# Patient Record
Sex: FEMALE | Race: WHITE | NOT HISPANIC OR LATINO | Employment: OTHER | URBAN - METROPOLITAN AREA
[De-identification: names, ages, dates, MRNs, and addresses within clinical notes are randomized per-mention and may not be internally consistent; named-entity substitution may affect disease eponyms.]

---

## 2021-04-19 ENCOUNTER — OFFICE VISIT (OUTPATIENT)
Dept: FAMILY MEDICINE CLINIC | Facility: CLINIC | Age: 56
End: 2021-04-19

## 2021-04-19 VITALS
HEIGHT: 64 IN | SYSTOLIC BLOOD PRESSURE: 118 MMHG | TEMPERATURE: 97.3 F | BODY MASS INDEX: 28.51 KG/M2 | RESPIRATION RATE: 16 BRPM | WEIGHT: 167 LBS | OXYGEN SATURATION: 100 % | HEART RATE: 86 BPM | DIASTOLIC BLOOD PRESSURE: 79 MMHG

## 2021-04-19 DIAGNOSIS — G89.29 CHRONIC BILATERAL LOW BACK PAIN WITH SCIATICA, SCIATICA LATERALITY UNSPECIFIED: ICD-10-CM

## 2021-04-19 DIAGNOSIS — H26.9 CATARACT OF RIGHT EYE, UNSPECIFIED CATARACT TYPE: Primary | ICD-10-CM

## 2021-04-19 DIAGNOSIS — J30.9 ALLERGIC RHINITIS, UNSPECIFIED SEASONALITY, UNSPECIFIED TRIGGER: ICD-10-CM

## 2021-04-19 DIAGNOSIS — M54.40 CHRONIC BILATERAL LOW BACK PAIN WITH SCIATICA, SCIATICA LATERALITY UNSPECIFIED: ICD-10-CM

## 2021-04-19 PROBLEM — K64.8 INTERNAL HEMORRHOIDS: Status: ACTIVE | Noted: 2021-03-24

## 2021-04-19 PROBLEM — F41.9 ANXIETY DISORDER: Status: ACTIVE | Noted: 2020-09-09

## 2021-04-19 PROBLEM — R19.8 ALTERED BOWEL FUNCTION: Status: ACTIVE | Noted: 2021-02-04

## 2021-04-19 PROBLEM — E06.3 AUTOIMMUNE HYPOTHYROIDISM: Status: ACTIVE | Noted: 2020-09-09

## 2021-04-19 PROBLEM — D12.6 TUBULAR ADENOMA OF COLON: Status: ACTIVE | Noted: 2021-03-24

## 2021-04-19 PROBLEM — R73.01 IMPAIRED FASTING GLUCOSE: Status: ACTIVE | Noted: 2020-09-09

## 2021-04-19 PROBLEM — Z12.11 COLON CANCER SCREENING: Status: ACTIVE | Noted: 2019-09-25

## 2021-04-19 PROBLEM — H05.89 ORBITAL MASS: Status: ACTIVE | Noted: 2018-01-02

## 2021-04-19 PROBLEM — J18.9 PNEUMONIA: Status: ACTIVE | Noted: 2018-04-01

## 2021-04-19 PROBLEM — F32.9 MAJOR DEPRESSIVE DISORDER: Status: ACTIVE | Noted: 2020-09-09

## 2021-04-19 PROBLEM — E03.9 HYPOTHYROIDISM: Status: ACTIVE | Noted: 2021-02-04

## 2021-04-19 PROBLEM — G56.20 CUBITAL TUNNEL SYNDROME: Status: ACTIVE | Noted: 2019-09-19

## 2021-04-19 PROBLEM — R93.5 ABNORMAL COMPUTERIZED AXIAL TOMOGRAPHY OF ABDOMEN: Status: ACTIVE | Noted: 2021-03-24

## 2021-04-19 PROBLEM — F43.12 CHRONIC POST-TRAUMATIC STRESS DISORDER: Status: ACTIVE | Noted: 2020-09-09

## 2021-04-19 PROBLEM — R63.4 ABNORMAL WEIGHT LOSS: Status: ACTIVE | Noted: 2021-02-04

## 2021-04-19 PROBLEM — E55.9 VITAMIN D DEFICIENCY: Status: ACTIVE | Noted: 2020-09-09

## 2021-04-19 PROBLEM — B02.9 SHINGLES: Status: ACTIVE | Noted: 2018-03-29

## 2021-04-19 PROBLEM — E78.2 MIXED HYPERLIPIDEMIA: Status: ACTIVE | Noted: 2020-09-09

## 2021-04-19 PROCEDURE — 99203 OFFICE O/P NEW LOW 30 MIN: CPT | Performed by: FAMILY MEDICINE

## 2021-04-19 RX ORDER — BUTALBITAL, ACETAMINOPHEN AND CAFFEINE 50; 325; 40 MG/1; MG/1; MG/1
TABLET ORAL
COMMUNITY

## 2021-04-19 RX ORDER — DULOXETIN HYDROCHLORIDE 60 MG/1
60 CAPSULE, DELAYED RELEASE ORAL DAILY
COMMUNITY

## 2021-04-19 RX ORDER — MELOXICAM 15 MG/1
TABLET ORAL
COMMUNITY
End: 2021-07-22

## 2021-04-19 RX ORDER — HYDROXYZINE HYDROCHLORIDE 25 MG/1
TABLET, FILM COATED ORAL
COMMUNITY

## 2021-04-19 RX ORDER — FLUTICASONE PROPIONATE 50 MCG
SPRAY, SUSPENSION (ML) NASAL
COMMUNITY
End: 2021-04-19 | Stop reason: SDUPTHER

## 2021-04-19 RX ORDER — LEVOTHYROXINE SODIUM 0.15 MG/1
TABLET ORAL
COMMUNITY

## 2021-04-19 RX ORDER — TIZANIDINE 2 MG/1
2 TABLET ORAL EVERY 8 HOURS PRN
Qty: 45 TABLET | Refills: 0 | Status: SHIPPED | OUTPATIENT
Start: 2021-04-19

## 2021-04-19 RX ORDER — FLUTICASONE PROPIONATE 50 MCG
1 SPRAY, SUSPENSION (ML) NASAL DAILY
Qty: 15.8 ML | Refills: 11 | Status: SHIPPED | OUTPATIENT
Start: 2021-04-19

## 2021-04-19 RX ORDER — CYCLOBENZAPRINE HCL 10 MG
TABLET ORAL
COMMUNITY
End: 2021-04-19

## 2021-04-19 RX ORDER — LORAZEPAM 2 MG/1
TABLET ORAL
COMMUNITY
End: 2021-07-22 | Stop reason: SDDI

## 2021-04-19 RX ORDER — ESTRADIOL 0.04 MG/D
FILM, EXTENDED RELEASE TRANSDERMAL
COMMUNITY

## 2021-04-19 RX ORDER — BUPROPION HYDROCHLORIDE 150 MG/1
TABLET ORAL
COMMUNITY
End: 2021-07-22 | Stop reason: SDDI

## 2021-04-19 NOTE — PROGRESS NOTES
Glenna Swain is a 56 y.o. female.     History of Present Illness     56-year-old female who is a new patient to me presents today to discuss hyperlipidemia, chronic back pain with sciatica, vitamin D deficiency, hypothyroidism and anxiety.    Recently moved to [place] from [facility].  Lipid panel CBC from September 2020 and July 2020 respectively normal.    Endoscopy March 2021; Normal esophagus, nonerosive gastritis normal examined duodenum.    Colonoscopy March 2021; several polyps and internal hemorrhoids; plan to repeat colonoscopy in 3 to 5 years.    Chronic low back pain with some sciatica symptoms; has done well with anti-inflammatories and cyclobenzaprine 10 mg in the past.  Of note when it comes to multiple joint pain issues she has been referred to rheumatology for further evaluation.    Anxiety disorder/depression/chronic PTSD: Follows with psychiatry; records from September 2020 showed that she is on treatment with lorazepam, hydroxyzine.  Per records, there is history of suicide attempt; [facility] Latuda did not work per patient; currently on Wellbutrin, Cymbalta, Ativan and Hydroxyzine PRN. [facility] [place] as well.    Hypothyroidism: Levothyroxine.    History of migraines: Fioricet in the past has been prescribed.  Has tried other medications which have not worked.  5-6 headaches a year on average.    Per records he is due for cataract surgery on right eye with ophthalmology in [place] needs new referral.    No flowsheet data found.    The following portions of the patient's history were reviewed and updated as appropriate: allergies, current medications, past family history, past medical history, past social history, past surgical history and problem list.    Review of Systems   Constitutional: Negative for chills and fever.   HENT: Negative for congestion.    Respiratory: Negative for cough and shortness of breath.     Cardiovascular: Negative for chest pain, palpitations and leg swelling.   Gastrointestinal: Negative for abdominal pain, diarrhea and vomiting.       Objective   Physical Exam  Constitutional:       Appearance: She is well-developed.   HENT:      Head: Normocephalic and atraumatic.      Mouth/Throat:      Pharynx: Uvula midline.   Eyes:      Pupils: Pupils are equal, round, and reactive to light.   Cardiovascular:      Rate and Rhythm: Normal rate and regular rhythm.      Heart sounds: No murmur heard.     Pulmonary:      Effort: Pulmonary effort is normal. No respiratory distress.      Breath sounds: Normal breath sounds. No stridor. No wheezing or rales.   Skin:     General: Skin is warm.   Neurological:      Mental Status: She is alert.   Psychiatric:         Behavior: Behavior normal.           No results found for: COLORU, CLARITYU, SPECGRAV, PHUR, LEUKOCYTESUR, NITRITE, PROTEINPOCUA, GLUCOSEUR, KETONESU, UROBILINOGEN, BILIRUBINUR, RBCUR      Assessment/Plan     Diagnoses and all orders for this visit:    1. Cataract of right eye, unspecified cataract type (Primary)  -     Ambulatory Referral to Ophthalmology    2. Chronic bilateral low back pain with sciatica, sciatica laterality unspecified  -     tiZANidine (ZANAFLEX) 2 MG tablet; Take 1 tablet by mouth Every 8 (Eight) Hours As Needed for Muscle Spasms.  Dispense: 45 tablet; Refill: 0  -     Ambulatory Referral to Physical Therapy Evaluate and treat    3. Allergic rhinitis, unspecified seasonality, unspecified trigger  -     fluticasone (Flonase) 50 MCG/ACT nasal spray; 1 spray into the nostril(s) as directed by provider Daily.  Dispense: 15.8 mL; Refill: 11    Return to clinic in 6 weeks to follow-up on low back pain after physical therapy.

## 2021-04-22 ENCOUNTER — TELEPHONE (OUTPATIENT)
Dept: FAMILY MEDICINE CLINIC | Facility: CLINIC | Age: 56
End: 2021-04-22

## 2021-04-22 NOTE — TELEPHONE ENCOUNTER
PATIENT IS REQUESTING A PROVIDER CHANGE TO [facility].  SHE STATES THAT HER INSURANCE STATES THAT SHE CANT SEE DR REEVES BUT CAN SEE [facility].  ACCORDING TO HER INSURANCE [facility] HAS SUBMITTED CREDENTIALS THAT MAKE HER AN ELIGIBLE PCP THAT DR REEVES HAS NOT    PATIENT WOULD LIKE TO BE SCHEDULED    PLEASE ADVISE    PATIENT CAN BE REACHED AT  [phone]

## 2021-04-23 ENCOUNTER — TELEPHONE (OUTPATIENT)
Dept: INTERNAL MEDICINE | Facility: CLINIC | Age: 56
End: 2021-04-23

## 2021-04-23 NOTE — TELEPHONE ENCOUNTER
Caller: Effie Swain    Relationship to patient: Self    Best call back number: [phone] (H)    Chief complaint: EYE SIGHT GETTING WORSE    Type of visit: NEW PATIENT WITH [facility] PA-C    Requested date: SOONER THAN 05/24/21    If rescheduling, when is the original appointment: 05/24/21    Additional notes: PATIENT STATES THAT SHE CAN NOT WAIT UNTIL 05/24/21 TO BE SEEN BY A DOCTOR.  PATIENT STATES THAT HER EYE SIGHT IS GETTING WORSE, AND DR. REEVES HAD A REFERRAL SET UP FOR HER TO SEE AN EYE DR. BUT DUE TO HER INSURANCE SHE CAN NO LONGER SEE DR. REEVES. PATIENT IS WANTING TO KNOW IF DR. [facility] CAN GO AHEAD AND SEND A REFERRAL TO DR. LOPEZ [facility] [place], HIS PHONE # IS: [phone].    PLEASE CONTACT PATIENT TO ADVISE.        THANKS

## 2021-04-23 NOTE — TELEPHONE ENCOUNTER
Caller: THOMAS GRACE    Relationship to patient: Emergency Contact    Best call back number: [phone]     Patient is needing: PATIENT'S SISTER WANTS TO KNOW IF DR CHONG IS TAKING NEW PATIENTS AND IF THE OFFICE TAKES [facility]-CARE.  PLEASE CALL BACK AND ADVISE.  THANK YOU.

## 2021-04-25 ENCOUNTER — TELEPHONE (OUTPATIENT)
Dept: FAMILY MEDICINE CLINIC | Facility: CLINIC | Age: 56
End: 2021-04-25

## 2021-04-25 DIAGNOSIS — H26.9 CATARACT OF RIGHT EYE, UNSPECIFIED CATARACT TYPE: Primary | ICD-10-CM

## 2021-04-25 RX ORDER — ONDANSETRON 4 MG/1
TABLET, ORALLY DISINTEGRATING ORAL
COMMUNITY

## 2021-04-25 RX ORDER — LURASIDONE HYDROCHLORIDE 60 MG/1
TABLET, FILM COATED ORAL
COMMUNITY
End: 2021-05-24 | Stop reason: SDDI

## 2021-04-26 NOTE — TELEPHONE ENCOUNTER
----- Message from Effie Swain sent at 4/25/2021  8:07 PM EDT -----  Regarding: Referral Request  Contact: [phone]  Devora, I am scheduled to see you 24 May but I need a referral to see an ophthalmologist at [facility].  I have a cataract on my right eye and was due for surgery before I moved to [place].  All the paperwork is in my file and I am getting to the point that I'm not feeling safe to drive. I have scheduled an appointment there for 7 May but  my insurance requires a referral and if I can't get one until the end of May then I probably can't get surgery until end of Jun or July.  [facility] am [facility] but I have had a hard time getting in for mental health care let alone primary care. Bruce Griffiths put in a referral that was denied because I can't have her as a PCM. I spoke with Wilder for an hour last week. Can you please help me?

## 2021-05-05 ENCOUNTER — IMMUNIZATION (OUTPATIENT)
Dept: VACCINE CLINIC | Facility: HOSPITAL | Age: 56
End: 2021-05-05

## 2021-05-05 PROCEDURE — 0001A: CPT | Performed by: INTERNAL MEDICINE

## 2021-05-05 PROCEDURE — 91300 HC SARSCOV02 VAC 30MCG/0.3ML IM: CPT | Performed by: INTERNAL MEDICINE

## 2021-05-24 ENCOUNTER — OFFICE VISIT (OUTPATIENT)
Dept: FAMILY MEDICINE CLINIC | Facility: CLINIC | Age: 56
End: 2021-05-24

## 2021-05-24 VITALS
BODY MASS INDEX: 30.56 KG/M2 | OXYGEN SATURATION: 100 % | DIASTOLIC BLOOD PRESSURE: 62 MMHG | TEMPERATURE: 97.5 F | RESPIRATION RATE: 16 BRPM | SYSTOLIC BLOOD PRESSURE: 119 MMHG | HEART RATE: 89 BPM | HEIGHT: 64 IN | WEIGHT: 179 LBS

## 2021-05-24 DIAGNOSIS — H25.9 AGE-RELATED CATARACT OF BOTH EYES, UNSPECIFIED AGE-RELATED CATARACT TYPE: ICD-10-CM

## 2021-05-24 DIAGNOSIS — L20.84 INTRINSIC ECZEMA: ICD-10-CM

## 2021-05-24 DIAGNOSIS — E89.0 POSTABLATIVE HYPOTHYROIDISM: Primary | ICD-10-CM

## 2021-05-24 DIAGNOSIS — H05.89 ORBITAL MASS: ICD-10-CM

## 2021-05-24 PROCEDURE — 99213 OFFICE O/P EST LOW 20 MIN: CPT | Performed by: PHYSICIAN ASSISTANT

## 2021-05-24 RX ORDER — FLUTICASONE PROPIONATE 0.05 %
CREAM (GRAM) TOPICAL 2 TIMES DAILY
Qty: 60 G | Refills: 1 | Status: SHIPPED | OUTPATIENT
Start: 2021-05-24

## 2021-05-24 RX ORDER — OLANZAPINE 2.5 MG/1
2.5 TABLET ORAL 2 TIMES DAILY
COMMUNITY

## 2021-05-24 NOTE — PROGRESS NOTES
"Subjective   Effie Swain is a 56 y.o. female.     History of Present Illness   Effie Swain 56 y.o. female /62 (BP Location: Right arm, Patient Position: Sitting, Cuff Size: Adult)   Pulse 89   Temp 97.5 °F (36.4 °C)   Resp 16   Ht 162.6 cm (64.02\")   Wt 81.2 kg (179 lb)   LMP 05/24/2018   SpO2 100%   BMI 30.71 kg/m²  who presents today for new pt appt with me.  She is hx Graves--TAM in 1980s. Also joint pain and inflammation.  She is on HRT.   she has a history of   Patient Active Problem List   Diagnosis   • Abnormal computerized axial tomography of abdomen   • Abnormal weight loss   • Acute bacterial rhinosinusitis   • Altered bowel function   • Anxiety disorder   • Autoimmune hypothyroidism   • Chronic post-traumatic stress disorder   • Colon cancer screening   • Cubital tunnel syndrome   • Major depressive disorder   • Ear congestion, bilateral   • Hypothyroidism   • Impaired fasting glucose   • Internal hemorrhoids   • Mixed hyperlipidemia   • Orbital mass   • Pneumonia   • Shingles   • Sinus headache   • Tubular adenoma of colon   • Vitamin D deficiency   .    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 Or a 5 year follow-up interval she has not developed symptoms to suggest systemic amyloid, or developed any recurrent orbital symptoms. She is scheduled for repeat CT scans of the orbits and is to call the office the following day for results and instructions. She is otherwise scheduled for 12 month follow-up visit.    Elia Quinn MD  [facility]  9/30/2019   Dr Corcoran--hand  DR Rico--hemorroid surgery  Rash on feet---intermittent; finger tips; peel--itch      She is bipolar d/o   Amyloid eye tumor---  [facility]  [facility] to [facility]--has appt  She is to have eye surgery  VA doing labs for joint pain  VA is changing " thyroid Rx  [facility] is writing her [facility] meds    The following portions of the patient's history were reviewed and updated as appropriate: allergies, current medications, past family history, past medical history, past social history, past surgical history and problem list.    Review of Systems   Constitutional: Negative for activity change, appetite change and unexpected weight change.   HENT: Negative for nosebleeds and trouble swallowing.    Eyes: Negative for pain and visual disturbance.   Respiratory: Negative for chest tightness, shortness of breath and wheezing.    Cardiovascular: Negative for chest pain and palpitations.   Gastrointestinal: Negative for abdominal pain and blood in stool.   Endocrine: Negative.    Genitourinary: Negative for difficulty urinating and hematuria.   Musculoskeletal: Negative for joint swelling.   Skin: Positive for color change and rash.   Allergic/Immunologic: Negative.    Neurological: Negative for syncope and speech difficulty.   Hematological: Negative for adenopathy.   Psychiatric/Behavioral: Negative for agitation and confusion.   All other systems reviewed and are negative.      Objective   Physical Exam  Vitals and nursing note reviewed.   Constitutional:       General: She is not in acute distress.     Appearance: She is well-developed. She is not ill-appearing or toxic-appearing.   HENT:      Head: Normocephalic.      Right Ear: External ear normal.      Left Ear: External ear normal.      Nose: Nose normal.      Mouth/Throat:      Pharynx: Oropharynx is clear.   Eyes:      General: No scleral icterus.     Conjunctiva/sclera: Conjunctivae normal.      Pupils: Pupils are equal, round, and reactive to light.   Neck:      Thyroid: No thyromegaly.   Cardiovascular:      Rate and Rhythm: Normal rate and regular rhythm.      Heart sounds: Normal heart sounds. No murmur heard.     Pulmonary:      Effort: Pulmonary effort is normal. No respiratory distress.      Breath sounds:  Normal breath sounds.   Musculoskeletal:         General: No deformity. Normal range of motion.      Cervical back: Normal range of motion and neck supple.   Skin:     General: Skin is warm and dry.      Findings: Rash present.      Comments: Top lateral feet; MP pink   Neurological:      General: No focal deficit present.      Mental Status: She is alert and oriented to person, place, and time. Mental status is at baseline.   Psychiatric:         Mood and Affect: Mood normal.         Behavior: Behavior normal.         Thought Content: Thought content normal.         Judgment: Judgment normal.         Assessment/Plan   Diagnoses and all orders for this visit:    1. Postablative hypothyroidism (Primary)    2. Age-related cataract of both eyes, unspecified age-related cataract type    3. Orbital mass  Comments:  right---amyloid---sees Dr Quinn  Orders:  -     Ambulatory Referral to Hematology / Oncology    4. Intrinsic eczema    Other orders  -     fluticasone (CUTIVATE) 0.05 % cream; Apply  topically to the appropriate area as directed 2 (Two) Times a Day. PRN rash  Dispense: 60 g; Refill: 1        Overdue to see oncologist for orbital mass---right; seeing DR Quinn; amyloidosis  Try topical cream-derm if no help  [facility]  [facility] for [facility]  Has eye appt  Scan [facility] labs  VA to CT lung low dose--stop smoking 5 years ago

## 2021-05-24 NOTE — PATIENT INSTRUCTIONS
Eczema  Eczema is a broad term for a group of skin conditions that cause skin to become rough and inflamed. Each type of eczema has different triggers, symptoms, and treatments. Eczema of any type is usually itchy and symptoms range from mild to severe.  Eczema and its symptoms are not spread from person to person (are not contagious). It can appear on different parts of the body at different times. Your eczema may not look the same as someone else's eczema.  What are the types of eczema?  Atopic dermatitis  This is a long-term (chronic) skin disease that keeps coming back (recurring). Usual symptoms are dry skin and small, solid pimples that may swell and leak fluid (weep).  Contact dermatitis    This happens when something irritates the skin and causes a rash. The irritation can come from substances that you are allergic to (allergens), such as poison ivy, chemicals, or medicines that were applied to your skin.  Dyshidrotic eczema  This is a form of eczema on the hands and feet. It shows up as very itchy, fluid-filled blisters. It can affect people of any age, but is more common before age 40.  Hand eczema    This causes very itchy areas of skin on the palms and sides of the hands and fingers. This type of eczema is common in industrial jobs where you may be exposed to many different types of irritants.  Lichen simplex chronicus  This type of eczema occurs when a person constantly scratches one area of the body. Repeated scratching of the area leads to thickened skin (lichenification). Lichen simplex chronicus can occur along with other types of eczema. It is more common in adults, but may be seen in children as well.  Nummular eczema  This is a common type of eczema. It has no known cause. It typically causes a red, circular, crusty lesion (plaque) that may be itchy. Scratching may become a habit and can cause bleeding. Nummular eczema occurs most often in people of middle-age or older. It most often affects the  "hands.  Seborrheic dermatitis  This is a common skin disease that mainly affects the scalp. It may also affect any oily areas of the body, such as the face, sides of nose, eyebrows, ears, eyelids, and chest. It is marked by small scaling and redness of the skin (erythema). This can affect people of all ages. In infants, this condition is known as \"cradle cap.\"  Stasis dermatitis  This is a common skin disease that usually appears on the legs and feet. It most often occurs in people who have a condition that prevents blood from being pumped through the veins in the legs (chronic venous insufficiency). Stasis dermatitis is a chronic condition that needs long-term management.  How is eczema diagnosed?  Your health care provider will examine your skin and review your medical history. He or she may also give you skin patch tests. These tests involve taking patches that contain possible allergens and placing them on your back. He or she will then check in a few days to see if an allergic reaction occurred.  What are the common treatments?  Treatment for eczema is based on the type of eczema you have. Hydrocortisone steroid medicine can relieve itching quickly and help reduce inflammation. This medicine may be prescribed or obtained over-the-counter, depending on the strength of the medicine that is needed.  Follow these instructions at home:  · Take over-the-counter and prescription medicines only as told by your health care provider.  · Use creams or ointments to moisturize your skin. Do not use lotions.  · Learn what triggers or irritates your symptoms. Avoid these things.  · Treat symptom flare-ups quickly.  · Do not itch your skin. This can make your rash worse.  · Keep all follow-up visits as told by your health care provider. This is important.  Where to find more information  · The American Academy of Dermatology: www.aad.org  · The National Eczema Association: www.nationaleczema.org  Contact a health care provider " if:  · You have serious itching, even with treatment.  · You regularly scratch your skin until it bleeds.  · Your rash looks different than usual.  · Your skin is painful, swollen, or more red than usual.  · You have a fever.  Summary  · There are eight general types of eczema. Each type has different triggers.  · Eczema of any type causes itching that may range from mild to severe.  · Treatment varies based on the type of eczema you have. Hydrocortisone steroid medicine can help with itching and inflammation.  · Protecting your skin is the best way to prevent eczema. Use moisturizers and lotions. Avoid triggers and irritants, and treat flare-ups quickly.  This information is not intended to replace advice given to you by your health care provider. Make sure you discuss any questions you have with your health care provider.  Document Revised: 11/30/2018 Document Reviewed: 05/03/2018  Elsevier Patient Education © 2021 Elsevier Inc.

## 2021-05-26 ENCOUNTER — IMMUNIZATION (OUTPATIENT)
Dept: VACCINE CLINIC | Facility: HOSPITAL | Age: 56
End: 2021-05-26

## 2021-05-26 PROCEDURE — 91300 HC SARSCOV02 VAC 30MCG/0.3ML IM: CPT | Performed by: INTERNAL MEDICINE

## 2021-05-26 PROCEDURE — 0002A: CPT | Performed by: INTERNAL MEDICINE

## 2021-07-22 ENCOUNTER — OFFICE VISIT (OUTPATIENT)
Dept: FAMILY MEDICINE CLINIC | Facility: CLINIC | Age: 56
End: 2021-07-22

## 2021-07-22 VITALS
RESPIRATION RATE: 16 BRPM | OXYGEN SATURATION: 100 % | BODY MASS INDEX: 32.27 KG/M2 | SYSTOLIC BLOOD PRESSURE: 117 MMHG | HEART RATE: 87 BPM | DIASTOLIC BLOOD PRESSURE: 59 MMHG | HEIGHT: 64 IN | WEIGHT: 189 LBS | TEMPERATURE: 97.5 F

## 2021-07-22 DIAGNOSIS — Z87.81 STATUS POST ORIF OF FRACTURE OF ANKLE: Primary | ICD-10-CM

## 2021-07-22 DIAGNOSIS — Z98.890 STATUS POST ORIF OF FRACTURE OF ANKLE: Primary | ICD-10-CM

## 2021-07-22 PROBLEM — S82.892A CLOSED LEFT ANKLE FRACTURE: Status: ACTIVE | Noted: 2021-06-29

## 2021-07-22 PROCEDURE — 99213 OFFICE O/P EST LOW 20 MIN: CPT | Performed by: PHYSICIAN ASSISTANT

## 2021-07-22 RX ORDER — IBUPROFEN 800 MG/1
800 TABLET ORAL EVERY 6 HOURS PRN
Qty: 90 TABLET | Refills: 0 | Status: SHIPPED | OUTPATIENT
Start: 2021-07-22

## 2021-07-22 RX ORDER — SUMATRIPTAN 25 MG/1
TABLET, FILM COATED ORAL
COMMUNITY
Start: 2021-06-09

## 2021-07-22 RX ORDER — LURASIDONE HYDROCHLORIDE 40 MG/1
40 TABLET, FILM COATED ORAL
COMMUNITY

## 2021-07-22 NOTE — PROGRESS NOTES
Subjective   Effie Swain is a 56 y.o. female.     History of Present Illness   Effie Swain 56 y.o. female presents today for Emergency Room follow up.  she was treated 6-23-21 for fx-----walking on rock--fall; fx   .  Oblique fractures involving the distal left tibia and fibula with widening of the ankle joint.    reviewed all of the labs and diagnostic testing and noted:  Xrays  And then 7-1-21 surgery--Dr Wu---plate and screws  The patient's medications were not changed:  Current outpatient and discharge medications have been reconciled for the patient.  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    Review of Systems   Constitutional: Negative for activity change, appetite change and unexpected weight change.   HENT: Negative for nosebleeds and trouble swallowing.    Eyes: Negative for pain and visual disturbance.   Respiratory: Negative for chest tightness, shortness of breath and wheezing.    Cardiovascular: Negative for chest pain and palpitations.   Gastrointestinal: Negative for abdominal pain and blood in stool.   Endocrine: Negative.    Genitourinary: Negative for difficulty urinating and hematuria.   Musculoskeletal: Positive for arthralgias and joint swelling.   Skin: Negative for color change and rash.   Allergic/Immunologic: Negative.    Neurological: Negative for syncope and speech difficulty.   Hematological: Negative for adenopathy.    Psychiatric/Behavioral: Negative for agitation and confusion.   All other systems reviewed and are negative.      Objective   Physical Exam  Vitals and nursing note reviewed.   Constitutional:       General: She is not in acute distress.     Appearance: She is well-developed. She is not ill-appearing or toxic-appearing.   HENT:      Head: Normocephalic.      Right Ear: External ear normal.      Left Ear: External ear normal.      Nose: Nose normal.      Mouth/Throat:      Pharynx: Oropharynx is clear.   Eyes:      General: No scleral icterus.     Conjunctiva/sclera: Conjunctivae normal.      Pupils: Pupils are equal, round, and reactive to light.   Neck:      Thyroid: No thyromegaly.   Cardiovascular:      Rate and Rhythm: Normal rate and regular rhythm.      Heart sounds: Normal heart sounds. No murmur heard.     Pulmonary:      Effort: Pulmonary effort is normal. No respiratory distress.      Breath sounds: Normal breath sounds.   Musculoskeletal:         General: Deformity present. Normal range of motion.      Cervical back: Normal range of motion and neck supple.   Skin:     General: Skin is warm and dry.      Findings: No rash.   Neurological:      General: No focal deficit present.      Mental Status: She is alert and oriented to person, place, and time. Mental status is at baseline.   Psychiatric:         Mood and Affect: Mood normal.         Behavior: Behavior normal.         Thought Content: Thought content normal.         Judgment: Judgment normal.         Assessment/Plan   Diagnoses and all orders for this visit:    1. Status post ORIF of fracture of ankle (Primary)    Other orders  -     ibuprofen (ADVIL,MOTRIN) 800 MG tablet; Take 1 tablet by mouth Every 6 (Six) Hours As Needed for Mild Pain . With food PRN pain; stop if GI upset  Dispense: 90 tablet; Refill: 0      Watch bruising with NSAID; stop if GI upset  Need copy labs from Rheumatologist---make sure renal labs recently checked  [facility]  other---[facility], Rheumatology, thyroid

## 2025-05-28 NOTE — PROGRESS NOTES
"Patient ID: Effie Swain is a 60 y.o. female {Specialty - why patient here?:[id number]}    This is a {Blank single:48560::\"telehealth\",\"telephone only\"} visit that was performed with the patient at *** and Dr. De La Paz at the [facility] in [place], [place]. Verbal consent to participate in {Blank single:71349::\"video and audio\",\"telephone only\"}  visit was obtained. I discussed with the patient the nature of our telemedicine visits, that:    - I would evaluate the patient and recommend diagnostics and treatments based on my assessment  - Our sessions are not being recorded and that personal health information is protected  - Our team would provide follow up care in person if/when the patient needs it    This duration of this call was *** minutes.    Subjective     The patient was seen in consultation with No chief complaint on file.  . ***    History of Present Illness    The following portions of the patient's history were reviewed and updated as appropriate: allergies, current medications, past family history, past medical history, past social history, past surgical history and problem list.    Review of Systems     Past Medical History:   Diagnosis Date    Anxiety        No Known Allergies    Family History   Problem Relation Age of Onset    Arthritis Mother     Mental illness Mother     Diabetes Mother     Thyroid disease Mother     Cancer Father        Social History     Socioeconomic History    Marital status: Single   Tobacco Use    Smoking status: Former     Current packs/day: 0.00     Types: Cigarettes     Quit date: 10/1/2016     Years since quittin.6    Smokeless tobacco: Never   Vaping Use    Vaping status: Never Used   Substance and Sexual Activity    Alcohol use: Not Currently    Drug use: Never    Sexual activity: Not Currently       Past Surgical History:   Procedure Laterality Date    COLONOSCOPY           Objective     There were no vitals filed for this " visit.  There is no height or weight on file to calculate BMI.    Physical Exam    [unfilled]    Assessment & Plan   Independent Review of Radiographic Studies:      I personally reviewed the images from the following studies.    ***    Assessment/Plan:    Medical Decision Making:      ***         There are no diagnoses linked to this encounter.           John Gonzalez Jr, MA  05/28/25  16:14 EDT

## 2025-05-29 NOTE — PROGRESS NOTES
Patient ID: Effie Swain is a 60 y.o. female is being seen for consultation today at the request of Edna Tucker for Other spondylosis, lumbar region.    Imaging    - MRI Lumbar Spine completed 03/04/2025.    Subjective     The patient is here in regards to   Chief Complaint   Patient presents with    Back Pain       History of Present Illness  Effie presents with several year history of back pain.  She denies any leg pain.  [facility].  She is a former smoker.      While in the room and during my examination of the patient, appropriate PPE was worn by both the patient and the physician.    The following portions of the patient's history were reviewed and updated as appropriate: allergies, current medications, past family history, past medical history, past social history, past surgical history and problem list.    Review of Systems   Constitutional:  Positive for fatigue. Negative for fever.   HENT:  Negative for ear pain and tinnitus.    Eyes:  Negative for visual disturbance.   Respiratory:  Negative for chest tightness and shortness of breath.    Cardiovascular:  Negative for chest pain.   Gastrointestinal:  Negative for nausea and vomiting.   Endocrine: Negative for cold intolerance and heat intolerance.   Genitourinary:  Negative for difficulty urinating.   Musculoskeletal:  Positive for back pain, gait problem, neck pain (pt reports pain level of 3.) and neck stiffness.   Skin:  Negative for rash.   Allergic/Immunologic: Negative for environmental allergies and food allergies.   Neurological:  Positive for numbness (pt reports numbness and tingling in both hands and feet.). Negative for dizziness, weakness, light-headedness and headaches.   Hematological:  Does not bruise/bleed easily.   Psychiatric/Behavioral:  Negative for confusion and decreased concentration.         Past Medical History:   Diagnosis Date    Anxiety        No Known Allergies    Family  History   Problem Relation Age of Onset    Arthritis Mother     Mental illness Mother     Diabetes Mother     Thyroid disease Mother     Cancer Father        Social History     Socioeconomic History    Marital status: Single   Tobacco Use    Smoking status: Former     Current packs/day: 0.00     Types: Cigarettes     Quit date: 10/1/2016     Years since quittin.6    Smokeless tobacco: Never   Vaping Use    Vaping status: Never Used   Substance and Sexual Activity    Alcohol use: Not Currently    Drug use: Never    Sexual activity: Not Currently       Past Surgical History:   Procedure Laterality Date    COLONOSCOPY           Objective     Vitals:    25 1153   BP: 104/84   Pulse: 87   Temp: 97.8 °F (36.6 °C)   SpO2: 96%     Body mass index is 36.27 kg/m².    Physical Exam  Constitutional:       General: She is awake.   HENT:      Head: Normocephalic and atraumatic.   Eyes:      General: Lids are normal.      Extraocular Movements: Extraocular movements intact.      Conjunctiva/sclera: Conjunctivae normal.      Pupils: Pupils are equal, round, and reactive to light.   Pulmonary:      Breath sounds: Normal breath sounds.   Abdominal:      Palpations: Abdomen is soft.   Musculoskeletal:         General: Normal range of motion.   Skin:     General: Skin is warm and dry.   Neurological:      Mental Status: She is alert.      Coordination: Coordination is intact.      Deep Tendon Reflexes: Reflexes are normal and symmetric.   Psychiatric:         Behavior: Behavior is cooperative.         Neurological Exam  Mental Status  Awake and alert. Oriented to person, place and time.    Cranial Nerves  CN II: Visual acuity is normal. Visual fields full to confrontation.  CN III, IV, VI: Extraocular movements intact bilaterally. Normal lids and orbits bilaterally. Pupils equal round and reactive to light bilaterally.  CN V: Facial sensation is normal.  CN VII: Full and symmetric facial movement.  CN IX, X: Palate elevates  symmetrically. Normal gag reflex.  CN XI: Shoulder shrug strength is normal.  CN XII: Tongue midline without atrophy or fasciculations.    Motor                                               Right                     Left  Deltoid                                   5                          5   Biceps                                   5                          5   Triceps                                  5                          5   Iliopsoas                               5                          5   Quadriceps                           5                          5   Hamstring                             5                          5   Gastrocnemius                     5                           5   Anterior tibialis                      5                          5    Sensory  Sensation is intact to light touch, pinprick, vibration and proprioception in all four extremities.    Reflexes  Deep tendon reflexes are 2+ and symmetric in all four extremities.    Coordination    Finger-to-nose, rapid alternating movements and heel-to-shin normal bilaterally without dysmetria.    Gait  Normal casual, toe, heel and tandem gait.      Assessment & Plan   Independent Review of Radiographic Studies:      I personally reviewed the images from the following studies.    FINDINGS:    MR: MRI of the lumbar spine wo contrast was reviewed and shows relatively healthy discs with hydration.  There is a mild L1 compression fracture not causing any significant retropulsion or deformity.    Assessment/Plan: Recommended over-the-counter medications and encouraged weight loss and exercise.    Medical Decision Making:      Follow-up as needed         Diagnoses and all orders for this visit:    1. Degeneration of intervertebral disc of lumbar region with discogenic back pain (Primary)             Patient Instructions/Recommendations:    Call with any questions or concerns      Issac De La Paz MD  05/30/25  13:00 EDT

## 2025-05-30 ENCOUNTER — OFFICE VISIT (OUTPATIENT)
Dept: NEUROSURGERY | Facility: CLINIC | Age: 60
End: 2025-05-30
Payer: OTHER GOVERNMENT

## 2025-05-30 VITALS
OXYGEN SATURATION: 96 % | BODY MASS INDEX: 36.27 KG/M2 | WEIGHT: 211.4 LBS | SYSTOLIC BLOOD PRESSURE: 104 MMHG | HEART RATE: 87 BPM | TEMPERATURE: 97.8 F | DIASTOLIC BLOOD PRESSURE: 84 MMHG

## 2025-05-30 DIAGNOSIS — M51.360 DEGENERATION OF INTERVERTEBRAL DISC OF LUMBAR REGION WITH DISCOGENIC BACK PAIN: Primary | ICD-10-CM

## 2025-05-30 RX ORDER — BUSPIRONE HYDROCHLORIDE 10 MG/1
10 TABLET ORAL
COMMUNITY
Start: 2024-12-12

## 2025-05-30 RX ORDER — QUETIAPINE FUMARATE 50 MG/1
25 TABLET, FILM COATED ORAL
COMMUNITY
Start: 2025-04-24

## 2025-05-30 RX ORDER — ZOLPIDEM TARTRATE 10 MG/1
10 TABLET ORAL
COMMUNITY
Start: 2025-04-24

## 2025-05-30 RX ORDER — PANTOPRAZOLE SODIUM 40 MG/1
40 TABLET, DELAYED RELEASE ORAL DAILY
COMMUNITY